# Patient Record
Sex: FEMALE | Race: WHITE | NOT HISPANIC OR LATINO | Employment: STUDENT | URBAN - METROPOLITAN AREA
[De-identification: names, ages, dates, MRNs, and addresses within clinical notes are randomized per-mention and may not be internally consistent; named-entity substitution may affect disease eponyms.]

---
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
## 2024-07-13 ENCOUNTER — APPOINTMENT (EMERGENCY)
Dept: RADIOLOGY | Facility: HOSPITAL | Age: 19
End: 2024-07-13
Payer: COMMERCIAL

## 2024-07-13 ENCOUNTER — HOSPITAL ENCOUNTER (EMERGENCY)
Facility: HOSPITAL | Age: 19
Discharge: HOME | End: 2024-07-14
Attending: EMERGENCY MEDICINE
Payer: COMMERCIAL

## 2024-07-13 VITALS
RESPIRATION RATE: 17 BRPM | OXYGEN SATURATION: 100 % | HEART RATE: 100 BPM | DIASTOLIC BLOOD PRESSURE: 78 MMHG | SYSTOLIC BLOOD PRESSURE: 132 MMHG | TEMPERATURE: 98.2 F | WEIGHT: 176.15 LBS

## 2024-07-13 DIAGNOSIS — S90.30XA CONTUSION OF FOOT: Primary | ICD-10-CM

## 2024-07-13 PROCEDURE — 73630 X-RAY EXAM OF FOOT: CPT

## 2024-07-13 PROCEDURE — 99283 EMERGENCY DEPT VISIT LOW MDM: CPT

## 2024-07-13 PROCEDURE — 81025 URINE PREGNANCY TEST: CPT | Performed by: EMERGENCY MEDICINE

## 2024-07-13 NOTE — Clinical Note
Jah Olmos was seen and treated in our emergency department on 7/13/2024.                Diagnosis:     Jah  may return to work on return date.    She may return on this date: 07/16/2024         If you have any questions or concerns, please don't hesitate to call.      Nico Hackett MD    ______________________________           _______________          _______________  Hospital Representative                              Date                                Time

## 2024-07-14 LAB
EXT PREGNANCY TEST URINE: NEGATIVE
EXT. CONTROL: NORMAL

## 2024-07-14 PROCEDURE — 99284 EMERGENCY DEPT VISIT MOD MDM: CPT | Performed by: EMERGENCY MEDICINE

## 2024-07-14 PROCEDURE — 96372 THER/PROPH/DIAG INJ SC/IM: CPT

## 2024-07-14 RX ORDER — KETOROLAC TROMETHAMINE 30 MG/ML
30 INJECTION, SOLUTION INTRAMUSCULAR; INTRAVENOUS ONCE
Status: COMPLETED | OUTPATIENT
Start: 2024-07-14 | End: 2024-07-14

## 2024-07-14 RX ADMIN — KETOROLAC TROMETHAMINE 30 MG: 30 INJECTION, SOLUTION INTRAMUSCULAR; INTRAVENOUS at 00:37

## 2024-07-14 NOTE — ED PROVIDER NOTES
History  Chief Complaint   Patient presents with    Foot Injury     Pt states she dropped a mirror on her L foot yesterday. Pt states pain is worse today with ambulating so she came in.     (Jah Olmos) Jah Olmos is a 19 y.o. female     They presented to the emergency department on July 14, 2024. Patient presents with:  Foot Injury: Pt states she dropped a mirror on her L foot yesterday. Pt states pain is worse today with ambulating so she came in.  .    The patient states that yesterday she dropped a foldable mirror from a shelf onto her left foot, notes that she has had worsening pain, however was able to walk and work shift as a  earlier today.  Patient states that she took Tylenol 2 hours ago, however due to her worsening pain her friend who is an  recommended that she come to the emergency department for evaluation.  Patient stated to nursing that there is a possibility of being pregnant. Patient denies numbness, tingling, weakness, or any other complaint at this time.              None       Past Medical History:   Diagnosis Date    Asthma        History reviewed. No pertinent surgical history.    History reviewed. No pertinent family history.  I have reviewed and agree with the history as documented.    E-Cigarette/Vaping    E-Cigarette Use Never User      E-Cigarette/Vaping Substances     Social History     Tobacco Use    Smoking status: Never    Smokeless tobacco: Never   Vaping Use    Vaping status: Never Used   Substance Use Topics    Alcohol use: Not Currently    Drug use: Never        Review of Systems   Constitutional:  Negative for chills and fever.   HENT:  Negative for ear pain and sore throat.    Eyes:  Negative for pain and visual disturbance.   Respiratory:  Negative for cough and shortness of breath.    Cardiovascular:  Negative for chest pain and palpitations.   Gastrointestinal:  Negative for abdominal pain and vomiting.   Genitourinary:  Negative for  dysuria and hematuria.   Musculoskeletal:  Negative for arthralgias and back pain.        Left foot pain   Skin:  Negative for color change and rash.   Neurological:  Negative for seizures and syncope.   All other systems reviewed and are negative.      Physical Exam  ED Triage Vitals [07/13/24 2327]   Temperature Pulse Respirations Blood Pressure SpO2   98.2 °F (36.8 °C) 100 17 132/78 100 %      Temp Source Heart Rate Source Patient Position - Orthostatic VS BP Location FiO2 (%)   Oral Monitor -- -- --      Pain Score       7             Orthostatic Vital Signs  Vitals:    07/13/24 2327   BP: 132/78   Pulse: 100       Physical Exam  Vitals and nursing note reviewed.   Constitutional:       Appearance: Normal appearance.   HENT:      Head: Normocephalic and atraumatic.      Right Ear: External ear normal.      Left Ear: External ear normal.      Nose: Nose normal.      Mouth/Throat:      Mouth: Mucous membranes are moist.   Eyes:      Conjunctiva/sclera: Conjunctivae normal.   Abdominal:      General: Abdomen is flat.   Musculoskeletal:         General: Tenderness (Tenderness to palpation overlying the anterior aspect of the right first MTP as well as posterior aspect, mild ecchymosis appreciated as well as small abrasion, no active bleeding) present. Normal range of motion.      Cervical back: Normal range of motion.   Skin:     General: Skin is warm and dry.   Neurological:      Mental Status: She is alert. Mental status is at baseline.   Psychiatric:         Mood and Affect: Mood normal.         ED Medications  Medications   ketorolac (TORADOL) injection 30 mg (30 mg Intramuscular Given 7/14/24 0037)       Diagnostic Studies  Results Reviewed       Procedure Component Value Units Date/Time    POCT pregnancy, urine [[id number]]  (Normal) Resulted: 07/14/24 0034    Lab Status: Final result Updated: 07/14/24 0039     EXT Preg Test, Ur Negative     Control Valid                   XR foot 3+ views LEFT   ED  Interpretation by Lizbeth Swain MD (07/14 0042)   NAD            Procedures  Procedures      ED Course                                       Medical Decision Making  19-year-old female presents to the emergency department with left foot pain status post dropping a mirror on her foot yesterday.  Patient seen and examined, noted to have tenderness overlying the first MTP with mild abrasion and ecchymosis.  Differential diagnosis includes but is not limited to contusion, less likely dislocation, possible fracture.  Will obtain x-ray of left foot to assess for osseous abnormality.  Patient relayed that she may be pregnant to nursing, urine pregnancy obtained which showed no evidence of positive hCG.  X-ray showed no acute bony osseous abnormality.  Patient provided with Toradol for pain control.  Patient provided with work note.  Advised patient on prognosis of healing contusion as well as advised to rest and elevate her foot as much as possible. Patient appears well, nontoxic, agrees with plan of care at this time.  Answered all questions.  In light of this, patient would benefit from outpatient follow-up.    Amount and/or Complexity of Data Reviewed  Labs: ordered.  Radiology: independent interpretation performed.    Risk  Prescription drug management.          Disposition  Final diagnoses:   Contusion of foot     Time reflects when diagnosis was documented in both MDM as applicable and the Disposition within this note       Time User Action Codes Description Comment    7/14/2024 12:36 AM Nico Hackett Add [S90.30XA] Contusion of foot           ED Disposition       ED Disposition   Discharge    Condition   Stable    Date/Time   Sun Jul 14, 2024 12:37 AM    Comment   Jah Olmos discharge to home/self care.                   Follow-up Information       Follow up With Specialties Details Why Contact Info    Joanna Vitale DO Family Medicine   [address] [place] [id number] [phone]               Patient's Medications    No medications on file     No discharge procedures on file.    PDMP Review       None             ED Provider  Attending physically available and evaluated Jah Olmos. I managed the patient along with the ED Attending.    Electronically Signed by           Nico Hackett MD  07/14/24 0042

## 2024-07-14 NOTE — ED ATTENDING ATTESTATION
7/13/2024  I, Lizbeth Swain MD, saw and evaluated the patient. I have discussed the patient with the resident/non-physician practitioner and agree with the resident's/non-physician practitioner's findings, Plan of Care, and MDM as documented in the resident's/non-physician practitioner's note, except where noted. All available labs and Radiology studies were reviewed.  I was present for key portions of any procedure(s) performed by the resident/non-physician practitioner and I was immediately available to provide assistance.       At this point I agree with the current assessment done in the Emergency Department.  I have conducted an independent evaluation of this patient a history and physical is as follows:    20 yo female without significant pmh p/w pain to dorsum of left foot after heavy mirror fell on it.  Pain with weight bearing.  No other associated injury.  No blood thinners.  On exam pt with mild bruising at base of 1st MTP, no significant swelling, skin intact.  TTP over dorsum max TTP base of 1st MTP.  NV intact.  Benign ankle.  Xray without obvious bony abnormality.  Likely contusion.  Recommend NSAIDs, ice, rest/elevation.  Ortho f/u for persistent symptoms.  Safe for dispo to home.  Work note provided.         ED Course  ED Course as of 07/14/24 0042   Sun Jul 14, 2024   0041 PREGNANCY TEST URINE: Negative  Not pregnant         Critical Care Time  Procedures